# Patient Record
Sex: FEMALE | Race: WHITE | NOT HISPANIC OR LATINO | URBAN - METROPOLITAN AREA
[De-identification: names, ages, dates, MRNs, and addresses within clinical notes are randomized per-mention and may not be internally consistent; named-entity substitution may affect disease eponyms.]

---

## 2020-07-06 ENCOUNTER — IMPORTED ENCOUNTER (OUTPATIENT)
Dept: URBAN - METROPOLITAN AREA CLINIC 50 | Facility: CLINIC | Age: 35
End: 2020-07-06

## 2020-07-08 ENCOUNTER — IMPORTED ENCOUNTER (OUTPATIENT)
Dept: URBAN - METROPOLITAN AREA CLINIC 50 | Facility: CLINIC | Age: 35
End: 2020-07-08

## 2020-07-09 ENCOUNTER — IMPORTED ENCOUNTER (OUTPATIENT)
Dept: URBAN - METROPOLITAN AREA CLINIC 50 | Facility: CLINIC | Age: 35
End: 2020-07-09

## 2020-07-16 ENCOUNTER — IMPORTED ENCOUNTER (OUTPATIENT)
Dept: URBAN - METROPOLITAN AREA CLINIC 50 | Facility: CLINIC | Age: 35
End: 2020-07-16

## 2020-09-30 ENCOUNTER — IMPORTED ENCOUNTER (OUTPATIENT)
Dept: URBAN - METROPOLITAN AREA CLINIC 50 | Facility: CLINIC | Age: 35
End: 2020-09-30

## 2020-10-01 ENCOUNTER — IMPORTED ENCOUNTER (OUTPATIENT)
Dept: URBAN - METROPOLITAN AREA CLINIC 50 | Facility: CLINIC | Age: 35
End: 2020-10-01

## 2021-04-17 ASSESSMENT — TONOMETRY
OD_IOP_MMHG: 17
OS_IOP_MMHG: 20
OD_IOP_MMHG: 20
OD_IOP_MMHG: 19
OS_IOP_MMHG: 19
OS_IOP_MMHG: 17

## 2021-04-17 ASSESSMENT — VISUAL ACUITY
OD_CC: J1+
OD_CC: J1+
OS_CC: 20/20
OS_SC: CF@5'
OS_CC: J1+
OS_CC: J1+
OD_CC: 20/20
OD_SC: CF@5'
OD_CC: 20/20-
OS_CC: 20/25-

## 2021-06-25 ENCOUNTER — PREPPED CHART (OUTPATIENT)
Dept: URBAN - METROPOLITAN AREA CLINIC 48 | Facility: CLINIC | Age: 36
End: 2021-06-25

## 2021-06-25 DIAGNOSIS — H04.123: ICD-10-CM

## 2021-06-25 PROCEDURE — 92012 INTRM OPH EXAM EST PATIENT: CPT

## 2021-06-25 NOTE — PATIENT DISCUSSION
Recommend Tobradex or topical steroid but patient is 5 weeks pregnant. Due to pregnancy will not use steroid due to Category C. Recommend continue Erythromycin ointment at this time. SEI is not central or affecting vision. Educated patient of possibility of scarring. Will monitor in 4 days. Advised to RTC sooner if increased redness, pain, or visual changes.

## 2021-06-25 NOTE — PATIENT DISCUSSION
Sterile SEI OD mid-peripheral. No overlying stain or epi defect. Patient is a CL wearer and admits to napping in CL. Patient has noticed significant improvement since starting Erythromycin ointment. Patient has not worn CL since last Wednesday.

## 2021-06-28 ASSESSMENT — TONOMETRY
OS_IOP_MMHG: 17
OD_IOP_MMHG: 17

## 2021-06-28 ASSESSMENT — VISUAL ACUITY
OD_CC: 20/25
OS_CC: 20/20

## 2021-07-02 ENCOUNTER — FOLLOW UP (OUTPATIENT)
Dept: URBAN - METROPOLITAN AREA CLINIC 49 | Facility: CLINIC | Age: 36
End: 2021-07-02

## 2021-07-02 DIAGNOSIS — H18.891: ICD-10-CM

## 2021-07-02 PROCEDURE — 92012 INTRM OPH EXAM EST PATIENT: CPT

## 2021-07-02 ASSESSMENT — TONOMETRY
OS_IOP_MMHG: 17
OD_IOP_MMHG: 16

## 2021-07-02 ASSESSMENT — VISUAL ACUITY
OD_CC: 20/20-2
OS_CC: 20/20

## 2021-07-02 NOTE — PATIENT DISCUSSION
Anticoagulation Summary  As of 2019    INR goal:   2.5-3.0   TTR:   61.2 % (4.6 y)   INR used for dosin.00! (2019)   Warfarin maintenance plan:   4.5 mg (3 mg x 1.5) every Thu; 3 mg (3 mg x 1) all other days   Weekly warfarin total:   22.5 mg   Plan last modified:   Samantha Amaya (10/31/2019)   Next INR check:   2020   Priority:   Maintenance   Target end date:   Indefinite    Indications    Atrial fibrillation (HCC) [I48.91]  Long term current use of anticoagulant therapy [Z79.01]             Anticoagulation Episode Summary     INR check location:   Home Draw    Preferred lab:       Send INR reminders to:       Comments:   [facility] [phone] -   goal range changed to 2.5-3.2 per raghu vaca 2019       Anticoagulation Care Providers     Provider Role Specialty Phone number    Hu Gamez M.D. Referring Cardiac Electrophysiology [phone]    [facility] Responsible  [phone]    Kristopher Escobar, PharmD Responsible          Anticoagulation Patient Findings          Spoke with Madeline to report a sub therapeutic INR of 2.0.  Will bolus dose with 6mg then resume current dosing regimen.  Follow up in 1   weeks, to reduce the risk of adverse events related to this high risk medication, warfarin.    Samantha Amaya, Clinical Pharmacist       Sterile SEI OD mid-peripheral. No overlying stain or epi defect. Patient is a CL wearer and admits to napping in CL. Patient has noticed significant improvement since starting Erythromycin ointment. Patient has not worn CL since last Wednesday.

## 2021-07-02 NOTE — PATIENT DISCUSSION
Improving. Advised patient to stay out of CL for a few weeks. RTC in 2 weeks. Patient may cancel due to improvement.

## 2021-07-12 ENCOUNTER — FOLLOW UP (OUTPATIENT)
Dept: URBAN - METROPOLITAN AREA CLINIC 48 | Facility: CLINIC | Age: 36
End: 2021-07-12

## 2021-07-12 DIAGNOSIS — H18.891: ICD-10-CM

## 2021-07-12 PROCEDURE — 92012 INTRM OPH EXAM EST PATIENT: CPT

## 2021-07-12 ASSESSMENT — VISUAL ACUITY
OS_CC: 20/20
OD_CC: J1+
OS_CC: J1+
OD_CC: 20/20
OU_CC: 20/20
OU_CC: J1+

## 2021-07-12 ASSESSMENT — TONOMETRY
OD_IOP_MMHG: 14
OS_IOP_MMHG: 14

## 2021-07-12 NOTE — PATIENT DISCUSSION
Previous Sterile SEI OD mid-peripheral. No overlying stain or epi defect. Patient is a CL wearer and admits to napping in CL.

## 2021-07-12 NOTE — PATIENT DISCUSSION
Patient was not started on a steroid due to patient being 6 weeks pregnant.  If condition reoccurs, will consider other options and consult with ophthalmology. Patient may need to discontinue CL if reoccurance. Educated on the risk of scarring and ulceration.

## 2021-07-12 NOTE — PATIENT DISCUSSION
Resolved with resultant faint stromal scar.  Patient may start wearing CL a few hours of the day. Stop Erythromycin, continue PF tears. Discussed with patient to remove lenses when napping, sleeping, swimming, showering. Patient verbally expressed understanding. Advised to remove CL immediately and RTC if increased redness, pain, or visual changes.

## 2021-08-16 ENCOUNTER — ROUTINE EXAM (OUTPATIENT)
Dept: URBAN - METROPOLITAN AREA CLINIC 48 | Facility: CLINIC | Age: 36
End: 2021-08-16

## 2021-08-16 DIAGNOSIS — Z01.00: ICD-10-CM

## 2021-08-16 DIAGNOSIS — H52.13: ICD-10-CM

## 2021-08-16 PROCEDURE — 92015 DETERMINE REFRACTIVE STATE: CPT

## 2021-08-16 PROCEDURE — 92012 INTRM OPH EXAM EST PATIENT: CPT

## 2021-08-16 PROCEDURE — CLF EW SOF CL FIT, EW, SOFT, SPH

## 2021-08-16 ASSESSMENT — TONOMETRY
OD_IOP_MMHG: 15
OS_IOP_MMHG: 15

## 2021-08-16 ASSESSMENT — KERATOMETRY
OD_K1POWER_DIOPTERS: 47.00
OD_AXISANGLE_DEGREES: 005
OS_K1POWER_DIOPTERS: 46.75
OS_AXISANGLE_DEGREES: 115
OD_K2POWER_DIOPTERS: 46.50
OD_AXISANGLE2_DEGREES: 95
OS_K2POWER_DIOPTERS: 46.25
OS_AXISANGLE2_DEGREES: 25

## 2021-08-16 ASSESSMENT — VISUAL ACUITY
OU_CC: 20/20-2
OS_CC: 20/20
OD_CC: 20/30 BLURRY
OU_CC: J1+

## 2021-08-16 NOTE — PATIENT DISCUSSION
Patient educated lenses are for daily wear only, should be removed at the end of the day. Patient should not sleep in lenses. Proper care and hygiene reviewed. Advised patient to remove CL if redness, pain, or visual changes.

## 2022-08-25 ENCOUNTER — COMPREHENSIVE EXAM (OUTPATIENT)
Dept: URBAN - METROPOLITAN AREA CLINIC 49 | Facility: CLINIC | Age: 37
End: 2022-08-25

## 2022-08-25 DIAGNOSIS — Z97.3: ICD-10-CM

## 2022-08-25 DIAGNOSIS — Z01.01: ICD-10-CM

## 2022-08-25 DIAGNOSIS — H52.13: ICD-10-CM

## 2022-08-25 DIAGNOSIS — H18.891: ICD-10-CM

## 2022-08-25 DIAGNOSIS — H04.123: ICD-10-CM

## 2022-08-25 PROCEDURE — 92014 COMPRE OPH EXAM EST PT 1/>: CPT

## 2022-08-25 PROCEDURE — 92310-1E ESTABLISHED CL PATIENT SPHERICAL SINGLE VISION SOFT LENS EVALUATION

## 2022-08-25 ASSESSMENT — TONOMETRY
OD_IOP_MMHG: 16
OS_IOP_MMHG: 16

## 2022-08-25 ASSESSMENT — KERATOMETRY
OD_AXISANGLE2_DEGREES: 95
OS_K1POWER_DIOPTERS: 46.75
OS_AXISANGLE_DEGREES: 115
OD_K2POWER_DIOPTERS: 46.50
OD_AXISANGLE_DEGREES: 5
OS_K2POWER_DIOPTERS: 46.25
OD_K1POWER_DIOPTERS: 47.00
OS_AXISANGLE2_DEGREES: 25

## 2022-08-25 ASSESSMENT — VISUAL ACUITY
OS_CC: 20/20-2
OD_CC: 20/20
OU_CC: J1+

## 2022-08-25 NOTE — PATIENT DISCUSSION
Good vision and comfort with lenses. Patient educated on lens care, hygiene, insertion/removal, and FAQs. Patient demonstrated ability to independently insert and remove lenses.

## 2022-08-25 NOTE — PATIENT DISCUSSION
Previous Sterile SEI OD mid-peripheral. Resultant scar. Patient is a CL wearer and admits to napping in CL.

## 2022-08-25 NOTE — PATIENT DISCUSSION
Discussed removal of lenses and prompt return to clinic with any pain, redness, or excessive discomfort. Advised against sleeping in lenses or using beyond time frame of approval and risk of infection or even possible loss of vision with doing so. Discussed proper contact lens hygiene such as no tap water or use of contact lenses with hot tubs.

## 2022-08-25 NOTE — PATIENT DISCUSSION
Patient is a current daily soft CL wearer. Leaving with CL trials in hand today. Advised patient to trial lenses for a few days then call back to finalize Rx.

## 2022-08-25 NOTE — PATIENT DISCUSSION
Discussed with patient to remove lenses when napping, sleeping, swimming, showering. Patient verbally expressed understanding. Advised to remove CL immediately and RTC if increased redness, pain, or visual changes.

## 2023-08-28 ENCOUNTER — COMPREHENSIVE EXAM (OUTPATIENT)
Dept: URBAN - METROPOLITAN AREA CLINIC 53 | Facility: CLINIC | Age: 38
End: 2023-08-28

## 2023-08-28 DIAGNOSIS — Z01.01: ICD-10-CM

## 2023-08-28 DIAGNOSIS — H04.123: ICD-10-CM

## 2023-08-28 DIAGNOSIS — H52.13: ICD-10-CM

## 2023-08-28 DIAGNOSIS — H18.891: ICD-10-CM

## 2023-08-28 DIAGNOSIS — Z97.3: ICD-10-CM

## 2023-08-28 PROCEDURE — 92015 DETERMINE REFRACTIVE STATE: CPT

## 2023-08-28 PROCEDURE — 92014 COMPRE OPH EXAM EST PT 1/>: CPT

## 2023-08-28 PROCEDURE — 92310-1E ESTABLISHED CL PATIENT SPHERICAL SINGLE VISION SOFT LENS EVALUATION

## 2023-08-28 ASSESSMENT — KERATOMETRY
OD_AXISANGLE2_DEGREES: 24
OD_AXISANGLE_DEGREES: 114
OS_AXISANGLE_DEGREES: 48
OS_AXISANGLE2_DEGREES: 138
OS_K2POWER_DIOPTERS: 46.75
OS_K1POWER_DIOPTERS: 46.25
OD_K2POWER_DIOPTERS: 47.00
OD_K1POWER_DIOPTERS: 46.75

## 2023-08-28 ASSESSMENT — TONOMETRY
OS_IOP_MMHG: 19
OD_IOP_MMHG: 19

## 2023-08-28 ASSESSMENT — VISUAL ACUITY
OU_CC: 20/20-1
OD_CC: 20/25-2
OD_CC: 20/30+2
OU_CC: J1+@16
OD_CC: J1+@16
OS_CC: J1+@16
OU_CC: J1+@16
OS_CC: 20/20-1
OS_CC: 20/20

## 2024-11-07 ENCOUNTER — COMPREHENSIVE EXAM (OUTPATIENT)
Dept: URBAN - METROPOLITAN AREA CLINIC 53 | Facility: CLINIC | Age: 39
End: 2024-11-07

## 2024-11-07 DIAGNOSIS — Z01.01: ICD-10-CM

## 2024-11-07 DIAGNOSIS — Z97.3: ICD-10-CM

## 2024-11-07 DIAGNOSIS — H52.13: ICD-10-CM

## 2024-11-07 DIAGNOSIS — H04.123: ICD-10-CM

## 2024-11-07 DIAGNOSIS — H18.891: ICD-10-CM

## 2024-11-07 PROCEDURE — 92310-1 LEVEL 1 SOFT LENS UPDATE

## 2024-11-07 PROCEDURE — 92014 COMPRE OPH EXAM EST PT 1/>: CPT

## 2024-11-07 PROCEDURE — 92015 DETERMINE REFRACTIVE STATE: CPT
